# Patient Record
Sex: MALE
[De-identification: names, ages, dates, MRNs, and addresses within clinical notes are randomized per-mention and may not be internally consistent; named-entity substitution may affect disease eponyms.]

---

## 2019-03-13 ENCOUNTER — HOSPITAL ENCOUNTER (OUTPATIENT)
Dept: HOSPITAL 88 - RAD | Age: 69
Discharge: HOME | End: 2019-03-13
Attending: UROLOGY
Payer: COMMERCIAL

## 2019-03-13 DIAGNOSIS — Z53.09: ICD-10-CM

## 2019-03-13 DIAGNOSIS — R97.20: Primary | ICD-10-CM

## 2019-03-13 LAB
ANION GAP SERPL CALC-SCNC: 10.9 MMOL/L (ref 8–16)
BASOPHILS # BLD AUTO: 0 10*3/UL (ref 0–0.1)
BASOPHILS NFR BLD AUTO: 0.3 % (ref 0–1)
BUN SERPL-MCNC: 7 MG/DL (ref 7–26)
BUN/CREAT SERPL: 9 (ref 6–25)
CALCIUM SERPL-MCNC: 8.7 MG/DL (ref 8.4–10.2)
CHLORIDE SERPL-SCNC: 101 MMOL/L (ref 98–107)
CO2 SERPL-SCNC: 27 MMOL/L (ref 22–29)
DEPRECATED NEUTROPHILS # BLD AUTO: 6.7 10*3/UL (ref 2.1–6.9)
EGFRCR SERPLBLD CKD-EPI 2021: > 60 ML/MIN (ref 60–?)
EOSINOPHIL # BLD AUTO: 0.3 10*3/UL (ref 0–0.4)
EOSINOPHIL NFR BLD AUTO: 2.8 % (ref 0–6)
ERYTHROCYTE [DISTWIDTH] IN CORD BLOOD: 13 % (ref 11.7–14.4)
GLUCOSE SERPLBLD-MCNC: 131 MG/DL (ref 74–118)
HCT VFR BLD AUTO: 44.4 % (ref 38.2–49.6)
HGB BLD-MCNC: 14.6 G/DL (ref 14–18)
LYMPHOCYTES # BLD: 2.4 10*3/UL (ref 1–3.2)
LYMPHOCYTES NFR BLD AUTO: 22.9 % (ref 18–39.1)
MCH RBC QN AUTO: 31.5 PG (ref 28–32)
MCHC RBC AUTO-ENTMCNC: 32.9 G/DL (ref 31–35)
MCV RBC AUTO: 95.9 FL (ref 81–99)
MONOCYTES # BLD AUTO: 0.9 10*3/UL (ref 0.2–0.8)
MONOCYTES NFR BLD AUTO: 8.9 % (ref 4.4–11.3)
NEUTS SEG NFR BLD AUTO: 64.7 % (ref 38.7–80)
PLATELET # BLD AUTO: 356 X10E3/UL (ref 140–360)
POTASSIUM SERPL-SCNC: 3.9 MMOL/L (ref 3.5–5.1)
RBC # BLD AUTO: 4.63 X10E6/UL (ref 4.3–5.7)
SODIUM SERPL-SCNC: 135 MMOL/L (ref 136–145)

## 2019-03-13 PROCEDURE — 93005 ELECTROCARDIOGRAM TRACING: CPT

## 2019-03-13 PROCEDURE — 82948 REAGENT STRIP/BLOOD GLUCOSE: CPT

## 2019-03-13 PROCEDURE — 71046 X-RAY EXAM CHEST 2 VIEWS: CPT

## 2019-03-13 PROCEDURE — 80048 BASIC METABOLIC PNL TOTAL CA: CPT

## 2019-03-13 PROCEDURE — 36415 COLL VENOUS BLD VENIPUNCTURE: CPT

## 2019-03-13 PROCEDURE — 85025 COMPLETE CBC W/AUTO DIFF WBC: CPT

## 2019-03-13 NOTE — DIAGNOSTIC IMAGING REPORT
EXAMINATION:  CHEST 2 VIEWS    



INDICATION: Pre-op.



COMPARISON:  None

     

FINDINGS:

TUBES and LINES:  None.



LUNGS: There is opacity in the left lower lung silhouetting the left border. On

the lateral view, an ovoid opacity projects over the mid thoracic vertebral

bodies. No evidence of pulmonary edema.



PLEURA:  No pleural effusion or pneumothorax. 



HEART AND MEDIASTINUM:  The cardiomediastinal silhouette is unremarkable.    



BONES AND SOFT TISSUES:  No acute osseous lesion.  Soft tissues are

unremarkable.



UPPER ABDOMEN: No free air under the diaphragm.    



IMPRESSION: 

Indeterminate left middle/posterior mediastinal opacity. Chest CT is

recommended for further evaluation.



Signed by: Dr. Sam Boucher MD on 3/13/2019 10:39 AM

## 2019-03-15 NOTE — XMS REPORT
Patient Summary Document

                             Created on: 03/15/2019



JOCELYNE CABA

External Reference #: [id number]

: 1950

Sex: Male



Demographics







                          Address                   [address] [place] [address]

 

                          Home Phone                [phone]

 

                          Preferred Language        Unknown

 

                          Marital Status            Unknown

 

                          Restoration Affiliation     Unknown

 

                          Race                      Unknown

 

                                        Additional Race(s)  

 

                          Ethnic Group              Unknown





Author







                          Author                    [facility]

 

                          [facility]

 

                          Address                   Unknown

 

                          Phone                     Unavailable







Care Team Providers







                    Care Team Member Name    Role                Phone

 

                    DEEPAK RIVAS    Unavailable         Unavailable







Problems

This patient has no known problems.



Allergies, Adverse Reactions, Alerts

This patient has no known allergies or adverse reactions.



Medications

This patient has no known medications.



Results







           Test Description    Test Time    Test Comments    Text Results    Atomic Results    Result

 Comments

 

                CHEST 2 VIEWS    2019 10:33:00                                                             

                                             [place] [address]  
   Patient Name: JOCELYNE CABA                                   MR #: 
[id number]                     : 1950                                  
Age/Sex: 69/M  Acct #: [id number]                              Req #: [id number]
[id number]  Adm Physician:                                                      
Ordered by: DEEPAK RIVAS MD                            Report #: [id number]   
    Location: [place]                                      Room/Bed:                 
   
___________________________________________________________________________________________________
   Procedure: [id number] DX/CHEST 2 VIEWS  Exam Date:                            
Exam Time:                                               REPORT STATUS: Signed  
 EXAMINATION:  CHEST 2 VIEWS          INDICATION: Pre-op.      COMPARISON:  None
          FINDINGS:   TUBES and LINES:  None.      LUNGS: There is opacity in 
the left lower lung silhouetting the left border. On   the lateral view, an 
ovoid opacity projects over the mid thoracic vertebral   bodies. No evidence of 
pulmonary edema.      PLEURA:  No pleural effusion or pneumothorax.       HEART 
AND MEDIASTINUM:  The cardiomediastinal silhouette is unremarkable.          BON
ES AND SOFT TISSUES:  No acute osseous lesion.  Soft tissues are   unremarkable.
     UPPER ABDOMEN: No free air under the diaphragm.          IMPRESSION:    
Indeterminate left middle/posterior mediastinal opacity. Chest CT is   
recommended for further evaluation.      Signed by: Dr. Stephanie Franco MD on 
3/13/2019 10:39 AM        Dictated By: STEPHANIE FRANCO MD  Electronically Signed By: 
STEPHANIE FRANCO MD on 19 1039  Transcribed By: SHABBIR on 19 1039       
COPY TO:   DEEPAK RIVAS MD

## 2019-04-19 ENCOUNTER — HOSPITAL ENCOUNTER (OUTPATIENT)
Dept: HOSPITAL 88 - CT | Age: 69
End: 2019-04-19
Attending: FAMILY MEDICINE
Payer: COMMERCIAL

## 2019-04-19 DIAGNOSIS — R91.8: Primary | ICD-10-CM

## 2019-04-19 PROCEDURE — 71250 CT THORAX DX C-: CPT

## 2019-04-19 NOTE — DIAGNOSTIC IMAGING REPORT
EXAMINATION: CT scan of the chest  without contrast.



TECHNIQUE: Spiral CT images of the chest were performed from the lung apices to

the level of the adrenal glands.  No intravenous contrast was administered per

referring physician request. Coronal and sagittal reformatted images were

obtained.



COMPARISON: Chest radiograph 3/13/2019



CLINICAL HISTORY:Abnormal chest x-ray



DISCUSSION:   ABSENCE OF INTRAVENOUS CONTRAST DECREASES SENSITIVITY FOR

DETECTION OF FOCAL LESIONS AND VASCULAR PATHOLOGY.



LINES/TUBES:  None.



LUNGS AND AIRWAYS: Linear airspace opacity in the right middle lobe compatible

with focal fibrotic change or subsegmental atelectasis. Similar findings in the

inferior lingula. Minimal groundglass and reticular opacity in the dependent

portions of the lower lobes compatible with subsegmental atelectasis. No

airspace consolidation, gross mass lesion, or bronchiectasis.



PLEURA: Prominent extrapleural fat. No pleural effusion or pneumothorax.



HEART AND MEDIASTINUM:  Visualized portions of the thyroid gland appear normal.

There is no ectasia or aneurysmal dilatation of the thoracic aorta.

Atherosclerotic calcification of the thoracic aorta, great vessel origins, and

right coronary artery. No pericardial effusion. No mediastinal mass.



LYMPH NODES: No axillary, hilar, or mediastinal lymphadenopathy. Subcentimeter

centrally calcified mediastinal lymph nodes compatible with prior granulomatous

disease. Subcentimeter calcified left hilar lymph nodes.



ABDOMEN: Visualized portions of the liver, spleen, pancreas, and adrenal glands

are unremarkable. Partially visualized 5.7 cm low-attenuation left renal

lesion, average internal attenuation 5-10 Hounsfield units, likely a simple

cyst.



BONES AND SOFT TISSUES: No osseous destructive lesion. Bulky anterior and

lateral osteophytes along the course of the thoracic spine, presumably

accounting for the ovoid opacity projecting over the posterior mediastinum on

the comparison chest radiograph. No focal soft tissue abnormalities.



IMPRESSION: 



No pulmonary parenchymal or mediastinal mass. Ovoid opacity projecting over the

left posterior mediastinum on the comparison chest radiograph likely represents

summation of large degenerative thoracic spine osteophytes and the descending

thoracic aorta.



Atherosclerotic vascular disease.



Evidence of prior granulomatous infection.



Probable simple cyst left kidney, partially visualized. This may be confirmed

by renal ultrasound.



Signed by: Dr. Dino Johnson M.D. on 4/19/2019 8:27 AM

## 2024-10-26 ENCOUNTER — HOSPITAL ENCOUNTER (EMERGENCY)
Dept: HOSPITAL 88 - ER | Age: 74
Discharge: HOME | End: 2024-10-26
Payer: MEDICARE

## 2024-10-26 VITALS — HEIGHT: 65 IN | WEIGHT: 205 LBS | BODY MASS INDEX: 34.16 KG/M2

## 2024-10-26 VITALS
HEART RATE: 78 BPM | OXYGEN SATURATION: 99 % | RESPIRATION RATE: 18 BRPM | SYSTOLIC BLOOD PRESSURE: 123 MMHG | DIASTOLIC BLOOD PRESSURE: 82 MMHG | TEMPERATURE: 98.1 F

## 2024-10-26 VITALS — HEART RATE: 72 BPM | TEMPERATURE: 98 F

## 2024-10-26 DIAGNOSIS — R94.31: ICD-10-CM

## 2024-10-26 DIAGNOSIS — I48.20: ICD-10-CM

## 2024-10-26 DIAGNOSIS — R42: Primary | ICD-10-CM

## 2024-10-26 DIAGNOSIS — R51.9: ICD-10-CM

## 2024-10-26 LAB
ALBUMIN SERPL-MCNC: 4 G/DL (ref 3.5–5)
ALBUMIN/GLOB SERPL: 1.2 {RATIO} (ref 0.8–2)
ALP SERPL-CCNC: 142 IU/L (ref 40–150)
ALT SERPL-CCNC: 24 IU/L (ref 0–55)
ANION GAP SERPL CALC-SCNC: 14.8 MMOL/L (ref 8–16)
BASOPHILS # BLD AUTO: 0 10*3/UL (ref 0–0.1)
BASOPHILS NFR BLD AUTO: 0.3 % (ref 0–1)
BILIRUB SERPL-MCNC: 1 MG/DL (ref 0.2–1.2)
BUN SERPL-MCNC: 11 MG/DL (ref 7–26)
BUN/CREAT SERPL: 13 (ref 6–25)
CALCIUM SERPL-MCNC: 9.7 MG/DL (ref 8.4–10.2)
CHLORIDE SERPL-SCNC: 101 MMOL/L (ref 98–107)
CK SERPL-CCNC: 97 IU/L (ref 30–200)
CO2 SERPL-SCNC: 24 MMOL/L (ref 22–29)
DEPRECATED APTT PLAS QN: 35.2 SECONDS (ref 23.8–35.5)
DEPRECATED INR PLAS: 1.38
DEPRECATED NEUTROPHILS # BLD AUTO: 4.4 10*3/UL (ref 2.1–6.9)
EGFRCR SERPLBLD CKD-EPI 2021: 92 ML/MIN (ref 60–?)
EOSINOPHIL # BLD AUTO: 0.1 10*3/UL (ref 0–0.4)
EOSINOPHIL NFR BLD AUTO: 1.7 % (ref 0–6)
ERYTHROCYTE [DISTWIDTH] IN CORD BLOOD: 12.9 % (ref 11.7–14.4)
GLOBULIN PLAS-MCNC: 3.3 G/DL (ref 2.3–3.5)
GLUCOSE SERPLBLD-MCNC: 158 MG/DL (ref 74–118)
HCT VFR BLD AUTO: 43.3 % (ref 38.2–49.6)
HGB BLD-MCNC: 14.1 G/DL (ref 14–18)
LYMPHOCYTES # BLD: 2.5 10*3/UL (ref 1–3.2)
LYMPHOCYTES NFR BLD AUTO: 33.4 % (ref 18–39.1)
MAGNESIUM SERPL-MCNC: 1.9 MG/DL (ref 1.3–2.1)
MCH RBC QN AUTO: 32.1 PG (ref 28–32)
MCHC RBC AUTO-ENTMCNC: 32.6 G/DL (ref 31–35)
MCV RBC AUTO: 98.6 FL (ref 81–99)
MONOCYTES # BLD AUTO: 0.5 10*3/UL (ref 0.2–0.8)
MONOCYTES NFR BLD AUTO: 6.4 % (ref 4.4–11.3)
NEUTS SEG NFR BLD AUTO: 57.9 % (ref 38.7–80)
PLATELET # BLD AUTO: 297 X10E3/UL (ref 140–360)
POTASSIUM SERPL-SCNC: 4.8 MMOL/L (ref 3.5–5.1)
PROT SERPL-MCNC: 7.3 G/DL (ref 6.5–8.1)
PROTHROMBIN TIME: 17.7 SECONDS (ref 11.9–14.5)
RBC # BLD AUTO: 4.39 X10E6/UL (ref 4.3–5.7)
SODIUM SERPL-SCNC: 135 MMOL/L (ref 136–145)
TROPONIN I SERPL DL<=0.01 NG/ML-MCNC: 0.01 NG/ML (ref 0–0.3)
WBC # BLD: 7.61 X10E3/UL (ref 4.8–10.8)

## 2024-10-26 PROCEDURE — 93005 ELECTROCARDIOGRAM TRACING: CPT

## 2024-10-26 PROCEDURE — 82550 ASSAY OF CK (CPK): CPT

## 2024-10-26 PROCEDURE — 83735 ASSAY OF MAGNESIUM: CPT

## 2024-10-26 PROCEDURE — 85730 THROMBOPLASTIN TIME PARTIAL: CPT

## 2024-10-26 PROCEDURE — 71045 X-RAY EXAM CHEST 1 VIEW: CPT

## 2024-10-26 PROCEDURE — 72125 CT NECK SPINE W/O DYE: CPT

## 2024-10-26 PROCEDURE — 84484 ASSAY OF TROPONIN QUANT: CPT

## 2024-10-26 PROCEDURE — 99284 EMERGENCY DEPT VISIT MOD MDM: CPT

## 2024-10-26 PROCEDURE — 85610 PROTHROMBIN TIME: CPT

## 2024-10-26 PROCEDURE — 80053 COMPREHEN METABOLIC PANEL: CPT

## 2024-10-26 PROCEDURE — 36415 COLL VENOUS BLD VENIPUNCTURE: CPT

## 2024-10-26 PROCEDURE — 70450 CT HEAD/BRAIN W/O DYE: CPT

## 2024-10-26 PROCEDURE — 85025 COMPLETE CBC W/AUTO DIFF WBC: CPT

## 2024-10-26 RX ADMIN — SODIUM CHLORIDE STA MLS/HR: 9 INJECTION, SOLUTION INTRAVENOUS at 14:31

## 2024-10-26 RX ADMIN — MECLIZINE HYDROCHLORIDE ONE MG: 12.5 TABLET ORAL at 14:31
